# Patient Record
Sex: FEMALE | Race: WHITE | Employment: OTHER | URBAN - METROPOLITAN AREA
[De-identification: names, ages, dates, MRNs, and addresses within clinical notes are randomized per-mention and may not be internally consistent; named-entity substitution may affect disease eponyms.]

---

## 2017-04-21 ENCOUNTER — OFFICE VISIT (OUTPATIENT)
Dept: FAMILY MEDICINE | Facility: CLINIC | Age: 52
End: 2017-04-21
Payer: COMMERCIAL

## 2017-04-21 VITALS
WEIGHT: 121 LBS | HEIGHT: 63 IN | HEART RATE: 68 BPM | TEMPERATURE: 97.1 F | BODY MASS INDEX: 21.44 KG/M2 | DIASTOLIC BLOOD PRESSURE: 70 MMHG | SYSTOLIC BLOOD PRESSURE: 110 MMHG

## 2017-04-21 DIAGNOSIS — R19.7 DIARRHEA, UNSPECIFIED TYPE: Primary | ICD-10-CM

## 2017-04-21 LAB
BASOPHILS # BLD AUTO: 0 10E9/L (ref 0–0.2)
BASOPHILS NFR BLD AUTO: 0.5 %
DIFFERENTIAL METHOD BLD: NORMAL
EOSINOPHIL # BLD AUTO: 0 10E9/L (ref 0–0.7)
EOSINOPHIL NFR BLD AUTO: 0.9 %
ERYTHROCYTE [DISTWIDTH] IN BLOOD BY AUTOMATED COUNT: 13.2 % (ref 10–15)
ERYTHROCYTE [SEDIMENTATION RATE] IN BLOOD BY WESTERGREN METHOD: 8 MM/H (ref 0–30)
HCT VFR BLD AUTO: 39.9 % (ref 35–47)
HGB BLD-MCNC: 13.4 G/DL (ref 11.7–15.7)
LYMPHOCYTES # BLD AUTO: 1.4 10E9/L (ref 0.8–5.3)
LYMPHOCYTES NFR BLD AUTO: 32.2 %
MCH RBC QN AUTO: 30.9 PG (ref 26.5–33)
MCHC RBC AUTO-ENTMCNC: 33.6 G/DL (ref 31.5–36.5)
MCV RBC AUTO: 92 FL (ref 78–100)
MONOCYTES # BLD AUTO: 0.6 10E9/L (ref 0–1.3)
MONOCYTES NFR BLD AUTO: 13.1 %
NEUTROPHILS # BLD AUTO: 2.4 10E9/L (ref 1.6–8.3)
NEUTROPHILS NFR BLD AUTO: 53.3 %
PLATELET # BLD AUTO: 164 10E9/L (ref 150–450)
RBC # BLD AUTO: 4.33 10E12/L (ref 3.8–5.2)
WBC # BLD AUTO: 4.4 10E9/L (ref 4–11)

## 2017-04-21 PROCEDURE — 85652 RBC SED RATE AUTOMATED: CPT | Performed by: INTERNAL MEDICINE

## 2017-04-21 PROCEDURE — 36415 COLL VENOUS BLD VENIPUNCTURE: CPT | Performed by: INTERNAL MEDICINE

## 2017-04-21 PROCEDURE — 85025 COMPLETE CBC W/AUTO DIFF WBC: CPT | Performed by: INTERNAL MEDICINE

## 2017-04-21 PROCEDURE — 99214 OFFICE O/P EST MOD 30 MIN: CPT | Performed by: INTERNAL MEDICINE

## 2017-04-21 PROCEDURE — 80053 COMPREHEN METABOLIC PANEL: CPT | Performed by: INTERNAL MEDICINE

## 2017-04-21 NOTE — PROGRESS NOTES
SUBJECTIVE:                                                    Olga Vargas is a 51 year old female who presents to clinic today for the following health issues:      Diarrhea     Onset: 2 - 3 months     Description:   Consistency of stool: watery  Blood in stool: no   Number of loose stools in past 24 hours: 6    Progression of Symptoms:  intermittent    Accompanying Signs & Symptoms:  Fever: no   Nausea or vomiting; no   Abdominal pain: YES  Episodes of constipation: no   Weight loss: no    History:   Ill contacts: no   Recent use of antibiotics: no    Recent travels: YES- Went to [place] in March, but had an episode the morning before leaving, was fine 2 weeks, then started again         Recent medication-new or changes(Rx or OTC): no     Precipitating factors:   na    Alleviating factors:   Anti-diarrhea pill, helps       Therapies Tried and outcome:  Tums, immodium; Outcome: Not helpful     Olga is here with intermittent diarrhea, which has been going on for the past few months.  Does not recall any preceding medication changes, acute illnesses, or antibiotics.  Traveled to [place] last month but symptoms were going on prior to that. She will have about half a day of multiple, urgent, watery stools along with crampy abdominal pain.  Antidiarrheal medication will help and things will go back to normal, then 10 days later or so will have another episode. Yesterday lasted up until dinner which was unusual.  She otherwise feels well, but has been anxious about when the next episode of diarrhea will come, has to be near a toilet. There are no triggers for the diarrhea that she can identify.  Appetite is okay, no nausea or vomiting, no fevers or chills.   No blood in the stool. Has never woke her from sleep.     She had a colonoscopy 2 years ago that was normal.  She also has a history of intermittent RUQ pain that feels all the world like gallbladder attack, but she's had multiple RUQ ultrasounds that have been  "normal.  She's has more frequent RUQ pain episodes in the past 5 months or so as well. No one else at home has diarrhea.       Reviewed and updated as needed this visit by clinical staff  Tobacco  Allergies  Meds         ROS:  Constitutional, HEENT, cardiovascular, pulmonary, gi and gu systems are negative, except as otherwise noted.    OBJECTIVE:                                                    /70 (BP Location: Left arm, Patient Position: Chair, Cuff Size: Adult Regular)  Pulse 68  Temp 97.1  F (36.2  C) (Tympanic)  Ht 5' 3\" (1.6 m)  Wt 121 lb (54.9 kg)  BMI 21.43 kg/m2  Body mass index is 21.43 kg/(m^2).    Gen: well appearing, pleasant woman, no distress  HEENT: PERRL, sclera nonicteric, oropharynx clear, MMM  Neck: supple, no LAD  Pulm: breathing comfortably, CTAB, no wheezes or rales  CV: RRR, normal S1 and S2, no murmurs  Abd: BS present, soft, nontender, nondistended  Ext: 2+ distal pulses, no LE edema          ASSESSMENT/PLAN:                                                      1. Diarrhea, unspecified type  Infectious vs secretory vs osmotic vs inflammatory.  The intermittent nature is confusing. Advised paying special attention to association with dairy products or artificial sweeteners. Make sure to eat plenty of fiber.   - Clostridium difficile Toxin B PCR; Future  - CBC with platelets and differential  - Ova and Parasite Exam Routine; Future  - ESR: Erythrocyte sedimentation rate  - Comprehensive metabolic panel (BMP + Alb, Alk Phos, ALT, AST, Total. Bili, TP)    F/U pending lab results.        Anayeli Londono MD  [facility] [place]    "

## 2017-04-21 NOTE — MR AVS SNAPSHOT
After Visit Summary   4/21/2017    Olga Vargas    MRN: [id number]           Patient Information     Date Of Birth          1965        Visit Information        Provider Department      4/21/2017 1:20 PM Anayeli Londono MD [facility] [place]        Today's Diagnoses     Diarrhea, unspecified type    -  1       Follow-ups after your visit        Future tests that were ordered for you today     Open Future Orders        Priority Expected Expires Ordered    Ova and Parasite Exam Routine Routine  4/21/2018 4/21/2017    Clostridium difficile Toxin B PCR Routine  1/1/2018 4/21/2017            Who to contact     If you have questions or need follow up information about today's clinic visit or your schedule please contact [facility] [place] directly at [phone].  Normal or non-critical lab and imaging results will be communicated to you by MyChart, letter or phone within 4 business days after the clinic has received the results. If you do not hear from us within 7 days, please contact the clinic through MyChart or phone. If you have a critical or abnormal lab result, we will notify you by phone as soon as possible.  Submit refill requests through [employer] or call your pharmacy and they will forward the refill request to us. Please allow 3 business days for your refill to be completed.          Additional Information About Your Visit        MyChart Information     [employer] gives you secure access to your electronic health record. If you see a primary care provider, you can also send messages to your care team and make appointments. If you have questions, please call your primary care clinic.  If you do not have a primary care provider, please call [phone] and they will assist you.        Care EveryWhere ID     This is your Care EveryWhere ID. This could be used by other organizations to access your [place] medical records  [id number]        Your Vitals Were     Pulse  "Temperature Height BMI (Body Mass Index)          68 97.1  F (36.2  C) (Tympanic) 5' 3\" (1.6 m) 21.43 kg/m2         Blood Pressure from Last 3 Encounters:   04/21/17 110/70   11/10/16 105/70   03/17/16 120/80    Weight from Last 3 Encounters:   04/21/17 121 lb (54.9 kg)   11/10/16 122 lb (55.3 kg)   03/17/16 127 lb (57.6 kg)              We Performed the Following     CBC with platelets and differential     Comprehensive metabolic panel (BMP + Alb, Alk Phos, ALT, AST, Total. Bili, TP)     ESR: Erythrocyte sedimentation rate        Primary Care Provider Office Phone # Fax #    Mabel Brady [phone]       [address]  [place] [address]        Thank you!     Thank you for choosing [facility]  for your care. Our goal is always to provide you with excellent care. Hearing back from our patients is one way we can continue to improve our services. Please take a few minutes to complete the written survey that you may receive in the mail after your visit with us. Thank you!             Your Updated Medication List - Protect others around you: Learn how to safely use, store and throw away your medicines at www.disposemymeds.org.          This list is accurate as of: 4/21/17  2:09 PM.  Always use your most recent med list.                   Brand Name Dispense Instructions for use    atorvastatin 40 MG tablet    LIPITOR    30 tablet    TAKE 1 TABLET BY MOUTH AT BEDTIME       mometasone 50 MCG/ACT spray    NASONEX    51 g    USE 2 SPRAYS INTO EACH NOSTRIL ONCE DAILY         "

## 2017-04-21 NOTE — NURSING NOTE
"Chief Complaint   Patient presents with     Diarrhea       Initial /70 (BP Location: Left arm, Patient Position: Chair, Cuff Size: Adult Regular)  Pulse 68  Temp 97.1  F (36.2  C) (Tympanic)  Ht 5' 3\" (1.6 m)  Wt 121 lb (54.9 kg)  BMI 21.43 kg/m2 Estimated body mass index is 21.43 kg/(m^2) as calculated from the following:    Height as of this encounter: 5' 3\" (1.6 m).    Weight as of this encounter: 121 lb (54.9 kg).  Medication Reconciliation: complete  "

## 2017-04-22 LAB
ALBUMIN SERPL-MCNC: 4.1 G/DL (ref 3.4–5)
ALP SERPL-CCNC: 75 U/L (ref 40–150)
ALT SERPL W P-5'-P-CCNC: 19 U/L (ref 0–50)
ANION GAP SERPL CALCULATED.3IONS-SCNC: 11 MMOL/L (ref 3–14)
AST SERPL W P-5'-P-CCNC: 14 U/L (ref 0–45)
BILIRUB SERPL-MCNC: 0.6 MG/DL (ref 0.2–1.3)
BUN SERPL-MCNC: 13 MG/DL (ref 7–30)
CALCIUM SERPL-MCNC: 8.7 MG/DL (ref 8.5–10.1)
CHLORIDE SERPL-SCNC: 107 MMOL/L (ref 94–109)
CO2 SERPL-SCNC: 24 MMOL/L (ref 20–32)
CREAT SERPL-MCNC: 0.72 MG/DL (ref 0.52–1.04)
GFR SERPL CREATININE-BSD FRML MDRD: 85 ML/MIN/1.7M2
GLUCOSE SERPL-MCNC: 71 MG/DL (ref 70–99)
POTASSIUM SERPL-SCNC: 3.7 MMOL/L (ref 3.4–5.3)
PROT SERPL-MCNC: 7.4 G/DL (ref 6.8–8.8)
SODIUM SERPL-SCNC: 142 MMOL/L (ref 133–144)

## 2017-05-03 DIAGNOSIS — R19.7 DIARRHEA, UNSPECIFIED TYPE: ICD-10-CM

## 2017-05-03 LAB
C DIFF TOX B STL QL: ABNORMAL
SPECIMEN SOURCE: ABNORMAL

## 2017-05-03 PROCEDURE — 87493 C DIFF AMPLIFIED PROBE: CPT | Performed by: INTERNAL MEDICINE

## 2017-05-03 PROCEDURE — 87177 OVA AND PARASITES SMEARS: CPT | Performed by: INTERNAL MEDICINE

## 2017-05-03 PROCEDURE — 87209 SMEAR COMPLEX STAIN: CPT | Performed by: INTERNAL MEDICINE

## 2017-05-04 ENCOUNTER — TELEPHONE (OUTPATIENT)
Dept: FAMILY MEDICINE | Facility: CLINIC | Age: 52
End: 2017-05-04

## 2017-05-04 DIAGNOSIS — A04.72 C. DIFFICILE COLITIS: Primary | ICD-10-CM

## 2017-05-04 LAB
MICRO REPORT STATUS: NORMAL
O+P STL MICRO: NORMAL
SPECIMEN SOURCE: NORMAL

## 2017-05-04 RX ORDER — METRONIDAZOLE 500 MG/1
500 TABLET ORAL 3 TIMES DAILY
Qty: 42 TABLET | Refills: 0 | Status: SHIPPED | OUTPATIENT
Start: 2017-05-04 | End: 2017-05-18

## 2017-05-04 NOTE — TELEPHONE ENCOUNTER
Spoke with patient and informed of below. Patient/ parent verbalized understanding and agrees with plan.  Education provided on C. Diff and how to prevent spreading the bacteria.     How does it spread?  C. diff leaves your body through your stool. You can get infected when :  1. You touch a surface that has this germ, then  2. You touch food or something else that you put in your mouth.  Here's how you, your family and visitors can help prevent the infection from spreading:     Wash hands often, especially in the hospital, after using the bathroom and before you eat.    Use antibiotics only when you need them. Don't ask for them if your doctor says you have a virus.    If you take antibiotics, follow the directions. Finish all the pills, even if you feel better.    If you have C. diff infection, try to use a separate restroom until you are well.    At home, clean countertops, sinks, faucets, bathroom doorknobs and toilets often. Use warm water with soap or cleaning products with bleach. (Don't use pure bleach. It's too strong.)    In the hospital, your care team should wear gloves and gowns. They should clean their hands before touching you and before leaving the room. If they don't, please remind them.  Hand washing  For this illness, soap and water works better than hand .    Wash hands with warm water and plenty of soap. Wash for 15 to 20 seconds.    Clean under nails, between fingers and up the wrists.    Rinse hands, letting water run down your fingers.    Dry hands well. Use a paper towel to turn off the faucet and open the door.     Sudha Avila RN   [facility] - Triage

## 2017-05-04 NOTE — TELEPHONE ENCOUNTER
Please call Olga and let her know stool test came back positive for C diff.  I sent metronidazole in to her pharmacy - she should take this for 14 days, avoid alcohol while taking.     Anayeli Londono MD

## 2017-05-09 DIAGNOSIS — E78.5 HYPERLIPIDEMIA LDL GOAL <130: ICD-10-CM

## 2017-05-09 NOTE — TELEPHONE ENCOUNTER
atorvastatin        Last Written Prescription Date: 10/25/2016  Last Fill Quantity: 30, # refills: 5    Last Office Visit with G, P or [facility] prescribing provider:  4/21/2017   Future Office Visit:      BP Readings from Last 3 Encounters:   04/21/17 110/70   11/10/16 105/70   03/17/16 120/80     Lab Results   Component Value Date    ALT 19 04/21/2017     Lab Results   Component Value Date    CHOL 204 10/24/2016     Lab Results   Component Value Date    HDL 67 10/24/2016     Lab Results   Component Value Date     10/24/2016     Lab Results   Component Value Date    TRIG 109 10/24/2016     Lab Results   Component Value Date    CHOLHDLRATIO 3.5 12/19/2014

## 2017-05-10 RX ORDER — ATORVASTATIN CALCIUM 40 MG/1
40 TABLET, FILM COATED ORAL AT BEDTIME
Qty: 90 TABLET | Refills: 0 | Status: SHIPPED | OUTPATIENT
Start: 2017-05-10

## 2017-05-10 NOTE — TELEPHONE ENCOUNTER
Due for annual physical  Medication is being filled for 1 time refill only due to:  due for CPE    Elaine Nye RN

## 2017-06-14 ENCOUNTER — TRANSFERRED RECORDS (OUTPATIENT)
Dept: HEALTH INFORMATION MANAGEMENT | Facility: CLINIC | Age: 52
End: 2017-06-14

## 2017-06-26 ENCOUNTER — TELEPHONE (OUTPATIENT)
Dept: FAMILY MEDICINE | Facility: CLINIC | Age: 52
End: 2017-06-26

## 2017-06-26 ENCOUNTER — OFFICE VISIT (OUTPATIENT)
Dept: FAMILY MEDICINE | Facility: CLINIC | Age: 52
End: 2017-06-26
Payer: COMMERCIAL

## 2017-06-26 VITALS
WEIGHT: 119 LBS | BODY MASS INDEX: 21.08 KG/M2 | SYSTOLIC BLOOD PRESSURE: 120 MMHG | HEART RATE: 59 BPM | TEMPERATURE: 97 F | DIASTOLIC BLOOD PRESSURE: 68 MMHG | OXYGEN SATURATION: 99 %

## 2017-06-26 DIAGNOSIS — R10.84 ABDOMINAL PAIN, GENERALIZED: Primary | ICD-10-CM

## 2017-06-26 PROCEDURE — 99213 OFFICE O/P EST LOW 20 MIN: CPT | Performed by: INTERNAL MEDICINE

## 2017-06-26 NOTE — PROGRESS NOTES
SUBJECTIVE:                                                    Olga Vargas is a 51 year old female who presents to clinic today for the following health issues:      ABDOMINAL PAIN     Onset: a month ago     Description:   Character: Sharp  Location: bilateral mid-stomach  Radiation: Back    Intensity: mild, severe    Progression of Symptoms:  intermittent    Accompanying Signs & Symptoms:  Fever/Chills?: no   Gas/Bloating: YES  Nausea: no   Vomitting: no   Diarrhea?: no   Constipation:no   Dysuria or Hematuria: no    History:   Trauma: no   Previous similar pain: no    Previous tests done: none    Precipitating factors:   Does the pain change with:     Food: no      BM: no     Urination: no     Alleviating factors:  na    Therapies Tried and outcome: Advil; helped. is much worse when lying down on back, pt notes now getting heart burn      LMP:  not applicable       Finished flagyl about 6 weeks ago.   Past 10 days having stomach pain that comes and goes. Located in RLQ/right side and LLQ/left side of her abdomen, less so in the middle.    Bowel movements are mostly back to normal, no blood.    There does not seem to be any pattern to the pain, except laying down at night does make it worse. The past 2 or 3 nights the pain has woken her up at night.    Seems to be getting worse.    Appetite is okay, no n/v. No fevers.   Also having heart burn now, started after the abdominal pain. Has been cutting down on typical GERD trigger foods which is helping a little.  Has taken some alkaseltzer for the GERD, but that didn't help.    Advil did help the stomach pain to some degree, but she has taken it infrequently.   Going to [place] in a week and a half for vacation.          Reviewed and updated as needed this visit by clinical staff  Tobacco  Allergies  Meds         ROS:  Const, cv, pulm, gi, and gu reviewed, negative unless noted above     OBJECTIVE:     /68 (BP Location: Right arm, Patient Position: Chair,  Cuff Size: Adult Regular)  Pulse 59  Temp 97  F (36.1  C) (Tympanic)  Wt 119 lb (54 kg)  SpO2 99%  BMI 21.08 kg/m2  Body mass index is 21.08 kg/(m^2).    Gen: well appearing, pleasant woman, no distress  HEENT: PERRL, sclera nonicteric, MMM  Pulm: breathing comfortably, CTAB, no wheezes or rales  CV: RRR, normal S1 and S2, no murmurs  Abd: BS present, soft, nontender, nondistended  Ext: 2+ distal pulses, no LE edema         ASSESSMENT/PLAN:       1. Abdominal pain, generalized  Unclear etiology - there are no major red flags besides the fact that it has woken her up a couple times at night.  Without diarrhea I do not suspect recurrence of C diff, may be some resolving inflammation?  Recommended symptomatic care with ibuprofen or tylenol, hot packs, bland diet, OTC medications for a few days.  If not improved or worsening by the end of this week, she will let me know and will plan to do CT scan, especially since she is going out of the [place] later next week.  Patient in agreement with plan.           Anayeli Londono MD  [facility] [place] [place]

## 2017-06-26 NOTE — TELEPHONE ENCOUNTER
Olga Vargas is a 51 year old female  who calls with abdominal pain. States that she had C diff about 1 month ago and was treated with Flagyl. Diarrhea is gone, but has had a couple of softer, looser stools since then. Main complaint is intermittent abdominal pain. Feels like a band of pain that wraps around her sides. This morning the pain is tolerable. Last night it was quite severe.    NURSING ASSESSMENT:  The pain began 1 month ago.    Pain scale (0-10): 7/10  LMP  was  Did not assess.    Allergies:   Allergies   Allergen Reactions     Morphine Other (See Comments)     Fainted       NURSING PLAN: Nursing advice to patient appointment today.    RECOMMENDED DISPOSITION:  See in 4 hours. Scheduled for 11:20  Will comply with recommendation: Yes  If further questions/concerns or if symptoms do not improve, worsen or new symptoms develop, call your PCP or [place] [facility] as soon as possible.    Guideline used:  Telephone Triage Protocols for Nurses, Fourth Edition, Catherine Rebolledo RN

## 2017-06-26 NOTE — MR AVS SNAPSHOT
After Visit Summary   6/26/2017    Olga Vargas    MRN: [id number]           Patient Information     Date Of Birth          1965        Visit Information        Provider Department      6/26/2017 11:20 AM Anayeli Londono MD [facility] [place]        Today's Diagnoses     Abdominal pain, generalized    -  1       Follow-ups after your visit        Who to contact     If you have questions or need follow up information about today's clinic visit or your schedule please contact [facility] [place] directly at [phone].  Normal or non-critical lab and imaging results will be communicated to you by [employer], letter or phone within 4 business days after the clinic has received the results. If you do not hear from us within 7 days, please contact the clinic through [employer] or phone. If you have a critical or abnormal lab result, we will notify you by phone as soon as possible.  Submit refill requests through [employer] or call your pharmacy and they will forward the refill request to us. Please allow 3 business days for your refill to be completed.          Additional Information About Your Visit        MyChart Information     [employer] gives you secure access to your electronic health record. If you see a primary care provider, you can also send messages to your care team and make appointments. If you have questions, please call your primary care clinic.  If you do not have a primary care provider, please call [phone] and they will assist you.        Care EveryWhere ID     This is your Care EveryWhere ID. This could be used by other organizations to access your [place] medical records  [id number]        Your Vitals Were     Pulse Temperature Pulse Oximetry BMI (Body Mass Index)          59 97  F (36.1  C) (Tympanic) 99% 21.08 kg/m2         Blood Pressure from Last 3 Encounters:   06/26/17 120/68   04/21/17 110/70   11/10/16 105/70    Weight from Last 3 Encounters:    06/26/17 119 lb (54 kg)   04/21/17 121 lb (54.9 kg)   11/10/16 122 lb (55.3 kg)              Today, you had the following     No orders found for display       Primary Care Provider Office Phone # Fax #    Anayeli Londono [phone]       [facility] [address] [place] [address] [address]  [place] [address]        Equal Access to Services     JIAN LARSEN : Hadii aad ku hadasho Soomaali, waaxda luqadaha, qaybta kaalmada adeegyada, waxay idiin hayaan adeeg kharash la'aan . So [facility] [phone].    ATENCIÓN: Si habla español, tiene a doe disposición servicios gratuitos de asistencia lingüística. [facility] [phone].    We comply with applicable federal civil rights laws and [place] laws. We do not discriminate on the basis of race, color, national origin, age, disability sex, sexual orientation or gender identity.            Thank you!     Thank you for choosing [facility] [place]  for your care. Our goal is always to provide you with excellent care. Hearing back from our patients is one way we can continue to improve our services. Please take a few minutes to complete the written survey that you may receive in the mail after your visit with us. Thank you!             Your Updated Medication List - Protect others around you: Learn how to safely use, store and throw away your medicines at www.disposemymeds.org.          This list is accurate as of: 6/26/17  8:31 PM.  Always use your most recent med list.                   Brand Name Dispense Instructions for use Diagnosis    atorvastatin 40 MG tablet    LIPITOR    90 tablet    Take 1 tablet (40 mg) by mouth At Bedtime . Due for annual physical exam    Hyperlipidemia LDL goal <130       mometasone 50 MCG/ACT spray    NASONEX    51 g    USE 2 SPRAYS INTO EACH NOSTRIL ONCE DAILY    Perennial allergic rhinitis

## 2017-06-26 NOTE — NURSING NOTE
"Chief Complaint   Patient presents with     Abdominal Pain       Initial /68 (BP Location: Right arm, Patient Position: Chair, Cuff Size: Adult Regular)  Pulse 59  Temp 97  F (36.1  C) (Tympanic)  Wt 119 lb (54 kg)  SpO2 99%  BMI 21.08 kg/m2 Estimated body mass index is 21.08 kg/(m^2) as calculated from the following:    Height as of 4/21/17: 5' 3\" (1.6 m).    Weight as of this encounter: 119 lb (54 kg).  Medication Reconciliation: complete  "

## 2018-01-24 ENCOUNTER — THERAPY VISIT (OUTPATIENT)
Dept: PHYSICAL THERAPY | Facility: CLINIC | Age: 53
End: 2018-01-24
Payer: COMMERCIAL

## 2018-01-24 DIAGNOSIS — M25.561 ACUTE PAIN OF RIGHT KNEE: Primary | ICD-10-CM

## 2018-01-24 PROCEDURE — 97161 PT EVAL LOW COMPLEX 20 MIN: CPT | Mod: GP

## 2018-01-24 PROCEDURE — 97035 APP MDLTY 1+ULTRASOUND EA 15: CPT | Mod: GP

## 2018-01-24 PROCEDURE — 97110 THERAPEUTIC EXERCISES: CPT | Mod: GP

## 2018-01-24 ASSESSMENT — ACTIVITIES OF DAILY LIVING (ADL)
GO DOWN STAIRS: ACTIVITY IS NOT DIFFICULT
STIFFNESS: I HAVE THE SYMPTOM BUT IT DOES NOT AFFECT MY ACTIVITY
LIMPING: I DO NOT HAVE THE SYMPTOM
AS_A_RESULT_OF_YOUR_KNEE_INJURY,_HOW_WOULD_YOU_RATE_YOUR_CURRENT_LEVEL_OF_DAILY_ACTIVITY?: NEARLY NORMAL
HOW_WOULD_YOU_RATE_THE_OVERALL_FUNCTION_OF_YOUR_KNEE_DURING_YOUR_USUAL_DAILY_ACTIVITIES?: NEARLY NORMAL
PAIN: I DO NOT HAVE THE SYMPTOM
GIVING WAY, BUCKLING OR SHIFTING OF KNEE: I DO NOT HAVE THE SYMPTOM
KNEEL ON THE FRONT OF YOUR KNEE: ACTIVITY IS NOT DIFFICULT
RISE FROM A CHAIR: ACTIVITY IS NOT DIFFICULT
SIT WITH YOUR KNEE BENT: ACTIVITY IS MINIMALLY DIFFICULT
WALK: ACTIVITY IS NOT DIFFICULT
HOW_WOULD_YOU_RATE_THE_CURRENT_FUNCTION_OF_YOUR_KNEE_DURING_YOUR_USUAL_DAILY_ACTIVITIES_ON_A_SCALE_FROM_0_TO_100_WITH_100_BEING_YOUR_LEVEL_OF_KNEE_FUNCTION_PRIOR_TO_YOUR_INJURY_AND_0_BEING_THE_INABILITY_TO_PERFORM_ANY_OF_YOUR_USUAL_DAILY_ACTIVITIES?: 90
SQUAT: ACTIVITY IS MINIMALLY DIFFICULT
RAW_SCORE: 66
KNEE_ACTIVITY_OF_DAILY_LIVING_SUM: 66
GO UP STAIRS: ACTIVITY IS NOT DIFFICULT
SWELLING: I DO NOT HAVE THE SYMPTOM
STAND: ACTIVITY IS NOT DIFFICULT
WEAKNESS: I HAVE THE SYMPTOM BUT IT DOES NOT AFFECT MY ACTIVITY
KNEE_ACTIVITY_OF_DAILY_LIVING_SCORE: 94.29

## 2018-01-24 NOTE — PROGRESS NOTES
[facility] [facility] Initial Evaluation  Subjective:  Patient is a 52 year old female presenting with rehab right knee hpi.   Olga Vargas is a 52 year old female with a right knee condition.  Condition occurred with:  Insidious onset.  Condition occurred: for unknown reasons.  This is a recurrent condition  Onset: on and off medial knee pain thinks it might be due to biking but not always; does bike trips sometimes (but not last year).  .    Patient reports pain:  Medial.     and is intermittent Pain Scale: When present, 7-8/10.  Associated symptoms:  Loss of motion/stiffness. Worse during: N/A   Symptoms are exacerbated by ascending stairs and descending stairs (my bother with biking ) and relieved by rest (avoidance).  Since onset symptoms are unchanged.  Special tests:  X-ray.                                                    Objective:  System                                                Knee Evaluation:  ROM:  AROM: normal  Strength wnl knee: proximal hip strength grossly 4+/5.            Ligament Testing:  Normal                Special Tests: Normal      Palpation:      Right knee tenderness present at:  Medial Joint Line            General     ROS    Assessment/Plan:    Patient is a 52 year old female with right side knee complaints.    Patient has the following significant findings with corresponding treatment plan.                Diagnosis 1:  R medial knee pain, pes anserine TTP, proximal weakness   Pain -  manual therapy, self management and home program  Decreased strength - therapeutic exercise and therapeutic activities  Impaired muscle performance - neuro re-education  Decreased function - therapeutic activities    Therapy Evaluation Codes:   1) History comprised of:   Personal factors that impact the plan of care:      None.    Comorbidity factors that impact the plan of care are:      None.     Medications impacting care: None.  2) Examination of Body Systems comprised of:   Body  structures and functions that impact the plan of care:      Knee.   Activity limitations that impact the plan of care are:      Squatting/kneeling and Stairs.  3) Clinical presentation characteristics are:   Stable/Uncomplicated.  4) Decision-Making    Low complexity using standardized patient assessment instrument and/or measureable assessment of functional outcome.  Cumulative Therapy Evaluation is: Low complexity.    Previous and current functional limitations:  (See Goal Flow Sheet for this information)    Short term and Long term goals: (See Goal Flow Sheet for this information)     Communication ability:  Patient appears to be able to clearly communicate and understand verbal and written communication and follow directions correctly.  Treatment Explanation - The following has been discussed with the patient:   RX ordered/plan of care  Anticipated outcomes  Possible risks and side effects  This patient would benefit from PT intervention to resume normal activities.   Rehab potential is good.    Frequency:  1 X week, once daily  Duration:  for 8 weeks  Discharge Plan:  Achieve all LTG.  Independent in home treatment program.  Reach maximal therapeutic benefit.    Please refer to the daily flowsheet for treatment today, total treatment time and time spent performing 1:1 timed codes.

## 2018-01-24 NOTE — MR AVS SNAPSHOT
After Visit Summary   1/24/2018    Olga Vargas    MRN: [id number]           Patient Information     Date Of Birth          1965        Visit Information        Provider Department      1/24/2018 9:40 AM [place] [place] [facility]        Today's Diagnoses     Acute pain of right knee    -  1       Follow-ups after your visit        Your next 10 appointments already scheduled     Jan 30, 2018  1:00 PM CST   DEN[facility] Extremity with [place] [facility] [place] [facility] ([facility] [place])    [address] [place] [address]  [address]  [place] [address]   [phone]            Feb 07, 2018  4:10 PM CST   DENITA Extremity with [place] [facility] [place] [facility] ([facility] [place])    [address] [place] [address]  [address]  [place] [address]   [phone]            Feb 13, 2018 10:30 AM CST   DENITA Extremity with [place] [facility] [place] [facility] ([facility] [place])    [address] [place] [address]  [address]  [place] [address]   [phone]            Feb 20, 2018  2:20 PM CST   DENITA Extremity with [place] [facility] [place] [facility] ([facility] [place])    [address] [place] [address]  [address]  [place] [address]   [phone]              Who to contact     If you have questions or need follow up information about today's clinic visit or your schedule please contact [facility] [place] [facility] directly at [phone].  Normal or non-critical lab and imaging results will be communicated to you by MyChart, letter or phone within 4 business days after the clinic has received the results. If you do not hear from us within 7 days, please contact the clinic through MyChart or phone. If you have a critical or abnormal lab result, we will notify you by phone as soon as  possible.  Submit refill requests through [facility] or call your pharmacy and they will forward the refill request to us. Please allow 3 business days for your refill to be completed.          Additional Information About Your Visit        [facility] Information     [facility] gives you secure access to your electronic health record. If you see a primary care provider, you can also send messages to your care team and make appointments. If you have questions, please call your primary care clinic.  If you do not have a primary care provider, please call [phone] and they will assist you.        Care EveryWhere ID     This is your Care EveryWhere ID. This could be used by other organizations to access your [place] medical records  [id number]         Blood Pressure from Last 3 Encounters:   06/26/17 120/68   04/21/17 110/70   11/10/16 105/70    Weight from Last 3 Encounters:   06/26/17 54 kg (119 lb)   04/21/17 54.9 kg (121 lb)   11/10/16 55.3 kg (122 lb)              We Performed the Following     HC PT EVAL, LOW COMPLEXITY     THERAPEUTIC EXERCISES     ULTRASOUND THERAPY        Primary Care Provider Office Phone # Fax #    Anayeli Londono [phone]       [address]  [place] [address]        Equal Access to Services     JIAN LARSEN : Hadii aad ku hadasho Soomaali, waaxda luqadaha, qaybta kaalmada adeegyada, waxay watson glez. So [facility] [phone].    ATENCIÓN: Si habla español, tiene a doe disposición servicios gratPhytoCeuticaos de asistencia lingüística. Llame al [phone].    We comply with applicable federal civil rights laws and [place] laws. We do not discriminate on the basis of race, color, national origin, age, disability, sex, sexual orientation, or gender identity.            Thank you!     [facility] you for [facility]  [place] [facility]  [facility]. Our goal is always to provide you with excellent care.  Hearing back from our patients is one way we can continue to improve our services. Please take a few minutes to complete the written survey that you may receive in the mail after your visit with us. Thank you!             Your Updated Medication List - Protect others around you: Learn how to safely use, store and throw away your medicines at www.disposemymeds.org.          This list is accurate as of 1/24/18 10:54 AM.  Always use your most recent med list.                   Brand Name Dispense Instructions for use Diagnosis    atorvastatin 40 MG tablet    LIPITOR    90 tablet    Take 1 tablet (40 mg) by mouth At Bedtime . Due for annual physical exam    Hyperlipidemia LDL goal <130       mometasone 50 MCG/ACT spray    NASONEX    51 g    USE 2 SPRAYS INTO EACH NOSTRIL ONCE DAILY    Perennial allergic rhinitis

## 2018-01-24 NOTE — PROGRESS NOTES
[facility] Initial Evaluation  Subjective:  Patient is a 52 year old female presenting with rehab left ankle/foot hpi.                                      Pertinent medical history includes:  Menopausal (Changes in bowel/bladder).  Medical allergies: yes (Morphine).  Surgical history: appendix.  Current medications:  None as reported by patient.  Current occupation is   .    Employment tasks: computer work.                                Objective:  System    Physical Exam    General     ROS    Assessment/Plan:

## 2018-01-30 ENCOUNTER — THERAPY VISIT (OUTPATIENT)
Dept: PHYSICAL THERAPY | Facility: CLINIC | Age: 53
End: 2018-01-30
Payer: COMMERCIAL

## 2018-01-30 DIAGNOSIS — M25.561 ACUTE PAIN OF RIGHT KNEE: ICD-10-CM

## 2018-01-30 PROCEDURE — 97112 NEUROMUSCULAR REEDUCATION: CPT | Mod: GP

## 2018-01-30 PROCEDURE — 97140 MANUAL THERAPY 1/> REGIONS: CPT | Mod: GP

## 2018-01-30 PROCEDURE — 97110 THERAPEUTIC EXERCISES: CPT | Mod: GP

## 2018-02-07 ENCOUNTER — THERAPY VISIT (OUTPATIENT)
Dept: PHYSICAL THERAPY | Facility: CLINIC | Age: 53
End: 2018-02-07
Payer: COMMERCIAL

## 2018-02-07 DIAGNOSIS — M25.561 ACUTE PAIN OF RIGHT KNEE: ICD-10-CM

## 2018-02-07 PROCEDURE — 97112 NEUROMUSCULAR REEDUCATION: CPT | Mod: GP

## 2018-02-07 PROCEDURE — 97110 THERAPEUTIC EXERCISES: CPT | Mod: GP

## 2018-02-13 ENCOUNTER — THERAPY VISIT (OUTPATIENT)
Dept: PHYSICAL THERAPY | Facility: CLINIC | Age: 53
End: 2018-02-13
Payer: COMMERCIAL

## 2018-02-13 DIAGNOSIS — M25.561 ACUTE PAIN OF RIGHT KNEE: ICD-10-CM

## 2018-02-13 PROCEDURE — 97112 NEUROMUSCULAR REEDUCATION: CPT | Mod: GP

## 2018-02-13 PROCEDURE — 97530 THERAPEUTIC ACTIVITIES: CPT | Mod: GP

## 2018-02-13 PROCEDURE — 97140 MANUAL THERAPY 1/> REGIONS: CPT | Mod: GP

## 2018-02-20 ENCOUNTER — THERAPY VISIT (OUTPATIENT)
Dept: PHYSICAL THERAPY | Facility: CLINIC | Age: 53
End: 2018-02-20
Payer: COMMERCIAL

## 2018-02-20 DIAGNOSIS — M25.561 ACUTE PAIN OF RIGHT KNEE: ICD-10-CM

## 2018-02-20 PROCEDURE — 97140 MANUAL THERAPY 1/> REGIONS: CPT | Mod: GP

## 2018-02-20 PROCEDURE — 97110 THERAPEUTIC EXERCISES: CPT | Mod: GP

## 2018-02-20 PROCEDURE — 97112 NEUROMUSCULAR REEDUCATION: CPT | Mod: GP

## 2019-05-16 ENCOUNTER — HEALTH MAINTENANCE LETTER (OUTPATIENT)
Age: 54
End: 2019-05-16

## 2019-09-30 ENCOUNTER — HEALTH MAINTENANCE LETTER (OUTPATIENT)
Age: 54
End: 2019-09-30

## 2019-10-29 ENCOUNTER — HEALTH MAINTENANCE LETTER (OUTPATIENT)
Age: 54
End: 2019-10-29

## 2020-02-06 ENCOUNTER — APPOINTMENT (OUTPATIENT)
Dept: CT IMAGING | Facility: CLINIC | Age: 55
End: 2020-02-06
Attending: EMERGENCY MEDICINE
Payer: COMMERCIAL

## 2020-02-06 ENCOUNTER — HOSPITAL ENCOUNTER (EMERGENCY)
Facility: CLINIC | Age: 55
Discharge: HOME | End: 2020-02-06
Attending: EMERGENCY MEDICINE | Admitting: EMERGENCY MEDICINE
Payer: COMMERCIAL

## 2020-02-06 VITALS
HEART RATE: 65 BPM | BODY MASS INDEX: 21.26 KG/M2 | TEMPERATURE: 97.4 F | HEIGHT: 63 IN | DIASTOLIC BLOOD PRESSURE: 75 MMHG | OXYGEN SATURATION: 100 % | WEIGHT: 120 LBS | RESPIRATION RATE: 20 BRPM | SYSTOLIC BLOOD PRESSURE: 119 MMHG

## 2020-02-06 DIAGNOSIS — N20.0 KIDNEY STONE: ICD-10-CM

## 2020-02-06 LAB
ALBUMIN UR-MCNC: NEGATIVE MG/DL
ANION GAP SERPL CALCULATED.3IONS-SCNC: 7 MMOL/L (ref 3–14)
APPEARANCE UR: CLEAR
BASOPHILS # BLD AUTO: 0 10E9/L (ref 0–0.2)
BASOPHILS NFR BLD AUTO: 0.8 %
BILIRUB UR QL STRIP: NEGATIVE
BUN SERPL-MCNC: 18 MG/DL (ref 7–30)
CALCIUM SERPL-MCNC: 9.1 MG/DL (ref 8.5–10.1)
CHLORIDE SERPL-SCNC: 108 MMOL/L (ref 94–109)
CO2 SERPL-SCNC: 22 MMOL/L (ref 20–32)
COLOR UR AUTO: ABNORMAL
CREAT SERPL-MCNC: 0.73 MG/DL (ref 0.52–1.04)
DIFFERENTIAL METHOD BLD: NORMAL
EOSINOPHIL # BLD AUTO: 0.1 10E9/L (ref 0–0.7)
EOSINOPHIL NFR BLD AUTO: 1.4 %
ERYTHROCYTE [DISTWIDTH] IN BLOOD BY AUTOMATED COUNT: 12.8 % (ref 10–15)
GFR SERPL CREATININE-BSD FRML MDRD: >90 ML/MIN/{1.73_M2}
GLUCOSE SERPL-MCNC: 147 MG/DL (ref 70–99)
GLUCOSE UR STRIP-MCNC: NEGATIVE MG/DL
HCT VFR BLD AUTO: 40.2 % (ref 35–47)
HGB BLD-MCNC: 13.7 G/DL (ref 11.7–15.7)
HGB UR QL STRIP: NEGATIVE
IMM GRANULOCYTES # BLD: 0 10E9/L (ref 0–0.4)
IMM GRANULOCYTES NFR BLD: 0 %
KETONES UR STRIP-MCNC: NEGATIVE MG/DL
LEUKOCYTE ESTERASE UR QL STRIP: NEGATIVE
LYMPHOCYTES # BLD AUTO: 2.4 10E9/L (ref 0.8–5.3)
LYMPHOCYTES NFR BLD AUTO: 49.1 %
MCH RBC QN AUTO: 30.5 PG (ref 26.5–33)
MCHC RBC AUTO-ENTMCNC: 34.1 G/DL (ref 31.5–36.5)
MCV RBC AUTO: 90 FL (ref 78–100)
MONOCYTES # BLD AUTO: 0.7 10E9/L (ref 0–1.3)
MONOCYTES NFR BLD AUTO: 13.4 %
MUCOUS THREADS #/AREA URNS LPF: PRESENT /LPF
NEUTROPHILS # BLD AUTO: 1.7 10E9/L (ref 1.6–8.3)
NEUTROPHILS NFR BLD AUTO: 35.3 %
NITRATE UR QL: NEGATIVE
NRBC # BLD AUTO: 0 10*3/UL
NRBC BLD AUTO-RTO: 0 /100
PH UR STRIP: 7 PH (ref 5–7)
PLATELET # BLD AUTO: 194 10E9/L (ref 150–450)
POTASSIUM SERPL-SCNC: 3.1 MMOL/L (ref 3.4–5.3)
RBC # BLD AUTO: 4.49 10E12/L (ref 3.8–5.2)
RBC #/AREA URNS AUTO: <1 /HPF (ref 0–2)
SODIUM SERPL-SCNC: 137 MMOL/L (ref 133–144)
SOURCE: ABNORMAL
SP GR UR STRIP: 1.01 (ref 1–1.03)
UROBILINOGEN UR STRIP-MCNC: NORMAL MG/DL (ref 0–2)
WBC # BLD AUTO: 4.9 10E9/L (ref 4–11)
WBC #/AREA URNS AUTO: 1 /HPF (ref 0–5)

## 2020-02-06 PROCEDURE — 96375 TX/PRO/DX INJ NEW DRUG ADDON: CPT

## 2020-02-06 PROCEDURE — 25000128 H RX IP 250 OP 636: Performed by: EMERGENCY MEDICINE

## 2020-02-06 PROCEDURE — 99285 EMERGENCY DEPT VISIT HI MDM: CPT | Mod: 25

## 2020-02-06 PROCEDURE — 25800030 ZZH RX IP 258 OP 636: Performed by: EMERGENCY MEDICINE

## 2020-02-06 PROCEDURE — 81001 URINALYSIS AUTO W/SCOPE: CPT | Performed by: EMERGENCY MEDICINE

## 2020-02-06 PROCEDURE — 80048 BASIC METABOLIC PNL TOTAL CA: CPT | Performed by: EMERGENCY MEDICINE

## 2020-02-06 PROCEDURE — 96374 THER/PROPH/DIAG INJ IV PUSH: CPT | Mod: 59

## 2020-02-06 PROCEDURE — 74177 CT ABD & PELVIS W/CONTRAST: CPT

## 2020-02-06 PROCEDURE — 25000132 ZZH RX MED GY IP 250 OP 250 PS 637: Performed by: EMERGENCY MEDICINE

## 2020-02-06 PROCEDURE — 87086 URINE CULTURE/COLONY COUNT: CPT | Performed by: EMERGENCY MEDICINE

## 2020-02-06 PROCEDURE — 85025 COMPLETE CBC W/AUTO DIFF WBC: CPT | Performed by: EMERGENCY MEDICINE

## 2020-02-06 PROCEDURE — 96361 HYDRATE IV INFUSION ADD-ON: CPT

## 2020-02-06 PROCEDURE — 25000125 ZZHC RX 250: Performed by: EMERGENCY MEDICINE

## 2020-02-06 RX ORDER — IBUPROFEN 600 MG/1
600 TABLET, FILM COATED ORAL EVERY 6 HOURS PRN
Qty: 30 TABLET | Refills: 0 | Status: SHIPPED | OUTPATIENT
Start: 2020-02-06

## 2020-02-06 RX ORDER — KETOROLAC TROMETHAMINE 15 MG/ML
15 INJECTION, SOLUTION INTRAMUSCULAR; INTRAVENOUS ONCE
Status: COMPLETED | OUTPATIENT
Start: 2020-02-06 | End: 2020-02-06

## 2020-02-06 RX ORDER — FENTANYL CITRATE 50 UG/ML
25 INJECTION, SOLUTION INTRAMUSCULAR; INTRAVENOUS ONCE
Status: COMPLETED | OUTPATIENT
Start: 2020-02-06 | End: 2020-02-06

## 2020-02-06 RX ORDER — IOPAMIDOL 755 MG/ML
60 INJECTION, SOLUTION INTRAVASCULAR ONCE
Status: COMPLETED | OUTPATIENT
Start: 2020-02-06 | End: 2020-02-06

## 2020-02-06 RX ORDER — ONDANSETRON 4 MG/1
4 TABLET, ORALLY DISINTEGRATING ORAL EVERY 8 HOURS PRN
Qty: 10 TABLET | Refills: 0 | Status: SHIPPED | OUTPATIENT
Start: 2020-02-06 | End: 2020-02-09

## 2020-02-06 RX ORDER — ONDANSETRON 2 MG/ML
4 INJECTION INTRAMUSCULAR; INTRAVENOUS ONCE
Status: COMPLETED | OUTPATIENT
Start: 2020-02-06 | End: 2020-02-06

## 2020-02-06 RX ORDER — FENTANYL CITRATE 50 UG/ML
75 INJECTION, SOLUTION INTRAMUSCULAR; INTRAVENOUS ONCE
Status: COMPLETED | OUTPATIENT
Start: 2020-02-06 | End: 2020-02-06

## 2020-02-06 RX ORDER — OXYCODONE HYDROCHLORIDE 5 MG/1
10 TABLET ORAL ONCE
Status: DISCONTINUED | OUTPATIENT
Start: 2020-02-06 | End: 2020-02-06 | Stop reason: CLARIF

## 2020-02-06 RX ORDER — OXYCODONE HYDROCHLORIDE 5 MG/1
5-10 TABLET ORAL EVERY 6 HOURS PRN
Qty: 20 TABLET | Refills: 0 | Status: SHIPPED | OUTPATIENT
Start: 2020-02-06

## 2020-02-06 RX ORDER — HYDROMORPHONE HYDROCHLORIDE 1 MG/ML
0.5 INJECTION, SOLUTION INTRAMUSCULAR; INTRAVENOUS; SUBCUTANEOUS
Status: DISCONTINUED | OUTPATIENT
Start: 2020-02-06 | End: 2020-02-06 | Stop reason: HOSPADM

## 2020-02-06 RX ORDER — OXYCODONE HYDROCHLORIDE 5 MG/1
5 TABLET ORAL ONCE
Status: COMPLETED | OUTPATIENT
Start: 2020-02-06 | End: 2020-02-06

## 2020-02-06 RX ORDER — TAMSULOSIN HYDROCHLORIDE 0.4 MG/1
0.4 CAPSULE ORAL DAILY
Qty: 10 CAPSULE | Refills: 0 | Status: SHIPPED | OUTPATIENT
Start: 2020-02-06 | End: 2020-02-16

## 2020-02-06 RX ADMIN — SODIUM CHLORIDE 60 ML: 9 INJECTION, SOLUTION INTRAVENOUS at 06:08

## 2020-02-06 RX ADMIN — ONDANSETRON 4 MG: 2 INJECTION INTRAMUSCULAR; INTRAVENOUS at 05:33

## 2020-02-06 RX ADMIN — FENTANYL CITRATE 25 MCG: 50 INJECTION INTRAMUSCULAR; INTRAVENOUS at 05:34

## 2020-02-06 RX ADMIN — KETOROLAC TROMETHAMINE 15 MG: 15 INJECTION, SOLUTION INTRAMUSCULAR; INTRAVENOUS at 05:35

## 2020-02-06 RX ADMIN — HYDROMORPHONE HYDROCHLORIDE 0.5 MG: 1 INJECTION, SOLUTION INTRAMUSCULAR; INTRAVENOUS; SUBCUTANEOUS at 06:14

## 2020-02-06 RX ADMIN — SODIUM CHLORIDE 1000 ML: 9 INJECTION, SOLUTION INTRAVENOUS at 05:38

## 2020-02-06 RX ADMIN — FENTANYL CITRATE 75 MCG: 50 INJECTION INTRAMUSCULAR; INTRAVENOUS at 05:40

## 2020-02-06 RX ADMIN — OXYCODONE HYDROCHLORIDE 5 MG: 5 TABLET ORAL at 08:32

## 2020-02-06 RX ADMIN — IOPAMIDOL 60 ML: 755 INJECTION, SOLUTION INTRAVENOUS at 06:07

## 2020-02-06 ASSESSMENT — ENCOUNTER SYMPTOMS
FLANK PAIN: 1
FEVER: 0
ABDOMINAL PAIN: 1

## 2020-02-06 ASSESSMENT — MIFFLIN-ST. JEOR: SCORE: 1108.45

## 2020-02-06 NOTE — ED AVS SNAPSHOT
Emergency Department  [address] [place] [address]  Phone:  [phone]  Fax:  [phone]                                    Olga Vargas   MRN: [id number]    Department:   Emergency Department   Date of Visit:  2/6/2020           After Visit Summary Signature Page    I have received my discharge instructions, and my questions have been answered. I have discussed any challenges I see with this plan with the nurse or doctor.    ..........................................................................................................................................  Patient/Patient Representative Signature      ..........................................................................................................................................  Patient Representative Print Name and Relationship to Patient    ..................................................               ................................................  Date                                   Time    ..........................................................................................................................................  Reviewed by Signature/Title    ...................................................              ..............................................  Date                                               Time          22EPIC Rev 08/18

## 2020-02-06 NOTE — ED PROVIDER NOTES
"  History     Chief Complaint:  Flank Pain    HPI   Olga Vargas is a 54 year old female who presents with flank pain. This morning the patient was woken up this morning at 0445 with severe sharp right sided back pain. The pain is present in her right lower quadrant in her abdomen as well. She denies fever. The patient denies history of kidney stone or another episode of this nature.     Allergies:  Morphine     Medications:    atorvastatin   mometasone nasal spray    Past Medical History:    Dysmenorrhea   Endometrial cells on cervical Pap smear inconsistent w/LMP   Lung nodule   Hyperlipidemia   History of C diff    Past Surgical History:    Appendectomy  Coronary angiography  Endometrial biopsy  Breast biopsy  D&E for 20 week pregnancy loss    Family History:    Father - allergies  Mother - obesity, vascular calcification  Brother- allergies  Daughter - allergies    Social History:  The patient was accompanied to the ED by .  Tobacco use: negative   Alcohol use: positive, 3 to 4 times weekly  PCP: Anayeli Londono     Review of Systems   Constitutional: Negative for fever.   Gastrointestinal: Positive for abdominal pain.   Genitourinary: Positive for flank pain.   10 point review of systems performed and is negative except as above and in HPI.      Physical Exam     Patient Vitals for the past 24 hrs:   BP Temp Temp src Pulse Heart Rate Resp SpO2 Height Weight   02/06/20 0900 126/76 -- -- 60 -- -- 100 % -- --   02/06/20 0830 130/85 -- -- 61 -- -- 100 % -- --   02/06/20 0800 121/74 -- -- 66 -- -- 100 % -- --   02/06/20 0730 115/74 -- -- 65 -- -- 100 % -- --   02/06/20 0615 (!) 143/105 -- -- 72 -- -- 100 % -- --   02/06/20 0600 -- -- -- -- -- -- 100 % -- --   02/06/20 0540 -- -- -- -- -- -- 99 % -- --   02/06/20 0514 (!) 150/101 97.4  F (36.3  C) Oral -- 77 20 100 % 1.6 m (5' 3\") 54.4 kg (120 lb)        Physical Exam    General: Resting on the gurney, appears very uncomfortable  Head:  The scalp, face, " and head appear normal  Mouth/Throat: Mucus membranes are moist  CV:  Regular rate    Normal S1 and S2  No pathological murmur   Resp:  Breath sounds clear and equal bilaterally    Non-labored, no retractions or accessory muscle use    No coarseness    No wheezing   GI:  Abdomen is soft, no rigidity    No tenderness to palpation. No guarding, no rebound.   MS:  Normal motor assessment of all extremities.    Good capillary refill noted.  Skin:   No rash or lesions noted.  Neuro:  Speech is normal and fluent. No apparent deficit.  Psych: Awake. Alert.  Normal affect.      Appropriate interactions.      Emergency Department Course     Imaging:  Radiology findings were communicated with the patient who voiced understanding of the findings.    CT Abdomen Pelvis w Contrast  1. Moderate right hydronephrosis due to a 2 mm UVJ stone.   2. 2 small liver liver lesions most likely benign hemangiomas. This could be confirmed with outpatient MRI  Reading per radiology    Laboratory:  Laboratory findings were communicated with the patient who voiced understanding of the findings.    UA with microscopic: mucous present o/w WNL  Urine culture: pending    CBC: WBC 4.9, HGB 13.7,   BMP: potassium 3.1 (L), glucose 147 (H) o/w WNL (Creatinine 0.73)    Interventions:  0533 Zofran 4 mg oral  0534 Fentanyl 25 mcg IV  0535 Toradol 15 mg IV  0538 NS bolus 1,000 ml IV  0540 Fentanyl 75 mcg IV  0614 Dilaudid 0.5 mg IV  0832 Oxycodone 5 mg oral    Emergency Department Course:  Nursing notes and vitals reviewed.    0629 I performed an exam of the patient as documented above.     The patient was sent for a CT abdomen pelvis while in the emergency department, results above.     IV was inserted and blood was drawn for laboratory testing, results above.    The patient provided a urine sample here in the emergency department. This was sent for laboratory testing, findings above.    0932 I returned to update the patient about their plan for  discharge.     Findings and plan explained to the Patient. Patient discharged home with instructions regarding supportive care, medications, and reasons to return. The importance of close follow-up was reviewed. The patient was prescribed Flomax, ibuprofen, zofran, and oxycodone.     Impression & Plan      Medical Decision Making:  Olga Vargas is a 54 year old female who presents complaining of right sided flank pain.  Evaluation today was consistent with nephrolithiasis and renal colic.  This explains the patient's pain.  Based on history and exam and the above studies, I do not feel that there is evidence of other acute intraabdominal process at this time.  CT scan showed moderate right hydronephrosis due to a 2 mm UVJ stone.   UA was obtained and no overt signs of infection are present.  Given the presence of the kidney stone, urine culture was sent.  The patient received pain control, antiemetic and IV fluids as documented above.  Pain and nausea was well controlled with these measures.  The patient was able to tolerate PO.  Based on the patient's good response to symptomatic control, I feel that this patient can be managed expectantly with close outpatient follow up within the next several days.   The patient was instructed to return to the emergency department for uncontrolled pain, fever or inability to tolerate oral liquids.  Prescriptions for Flomax, ibuprofen, oxycodone, and zofran were given.      Diagnosis:    ICD-10-CM    1. Kidney stone N20.0        Disposition:   The patient is discharged to home.    Discharge Medications:  New Prescriptions    IBUPROFEN (ADVIL/MOTRIN) 600 MG TABLET    Take 1 tablet (600 mg) by mouth every 6 hours as needed for moderate pain    ONDANSETRON (ZOFRAN ODT) 4 MG ODT TAB    Take 1 tablet (4 mg) by mouth every 8 hours as needed    OXYCODONE (ROXICODONE) 5 MG TABLET    Take 1-2 tablets (5-10 mg) by mouth every 6 hours as needed for severe pain    TAMSULOSIN (FLOMAX) 0.4 MG  CAPSULE    Take 1 capsule (0.4 mg) by mouth daily for 10 doses       Scribe Disclosure:  I, Mariela Schultz, am serving as a scribe at 5:53 AM on 2/6/2020 to document services personally performed by [place] MD based on my observations and the provider's statements to me.        Sabrina Austin MD  02/07/20 0619

## 2020-02-07 LAB
BACTERIA SPEC CULT: NO GROWTH
Lab: NORMAL
SPECIMEN SOURCE: NORMAL

## 2020-02-10 ENCOUNTER — OFFICE VISIT (OUTPATIENT)
Dept: UROLOGY | Facility: CLINIC | Age: 55
End: 2020-02-10
Payer: COMMERCIAL

## 2020-02-10 VITALS
OXYGEN SATURATION: 98 % | HEIGHT: 63 IN | HEART RATE: 70 BPM | SYSTOLIC BLOOD PRESSURE: 126 MMHG | WEIGHT: 120 LBS | DIASTOLIC BLOOD PRESSURE: 80 MMHG | BODY MASS INDEX: 21.26 KG/M2

## 2020-02-10 DIAGNOSIS — N20.1 URETERAL STONE: Primary | ICD-10-CM

## 2020-02-10 PROCEDURE — 99203 OFFICE O/P NEW LOW 30 MIN: CPT | Performed by: PHYSICIAN ASSISTANT

## 2020-02-10 PROCEDURE — 82365 CALCULUS SPECTROSCOPY: CPT | Mod: 90 | Performed by: PHYSICIAN ASSISTANT

## 2020-02-10 PROCEDURE — 99000 SPECIMEN HANDLING OFFICE-LAB: CPT | Performed by: PHYSICIAN ASSISTANT

## 2020-02-10 ASSESSMENT — PAIN SCALES - GENERAL: PAINLEVEL: NO PAIN (0)

## 2020-02-10 ASSESSMENT — MIFFLIN-ST. JEOR: SCORE: 1108.45

## 2020-02-10 NOTE — PROGRESS NOTES
CC: kidney stone.    HPI: It is a pleasure to see Ms. Olga Vargas, a 54 year old female seen today in the urology clinic in ER follow up for evaluation of the above. This was first detected on CT in the ED on 2/6/2020 after the patient presented complaining of acute renal colic symptoms. The stone measured 2mm and is located in the right UVJ with associated hydronephrosis. UA in the ED was negative for infection. BMP revealed Cr and Ca to be normal. With pain under good control, the patient was discharged with a prescription for tamsulosin and instructions to follow up with urology.    Currently, the patient reports passage of stone and brings this with her today. Denies gross hematuria, dysuria, fevers, chills, N/V. No prior history of kidney stones.    RECENT IMAGING:  EXAM: CT ABDOMEN PELVIS W CONTRAST  LOCATION: [facility]  DATE/TIME: 2/6/2020 6:09 AM     INDICATION: Right flank pain.     COMPARISON: None.     TECHNIQUE: CT scan of the abdomen and pelvis was performed following injection of IV contrast. Multiplanar reformats were obtained. Dose reduction techniques were used.     CONTRAST: 60 mL Isovue-370.     FINDINGS:   LOWER CHEST: Normal.     HEPATOBILIARY: 1.8 cm enhancing lesion at the dome the liver and a subcentimeter enhancing lesion in the inferior right hepatic lobe are probably benign hemangioma though incompletely characterized.     PANCREAS: Normal.     SPLEEN: Normal.     ADRENAL GLANDS: Normal.     KIDNEYS/BLADDER: Moderate right hydronephrosis due to a 2 mm stone in the distal ureter at the UVJ. Delayed right nephrogram. Low-attenuation subcentimeter renal lesion(s). These are compatible with small benign cysts and no specific imaging evaluation   or follow-up is recommended.     BOWEL: Within normal limits. No bowel obstruction.     LYMPH NODES: Normal.     VASCULATURE: Unremarkable.     PELVIC ORGANS: Normal.     OTHER FINDINGS: No significant ascites.     MUSCULOSKELETAL:  Normal.                                                                      IMPRESSION:  1. Moderate right hydronephrosis due to a 2 mm UVJ stone.   2. 2 small liver liver lesions most likely benign hemangiomas. This could be confirmed with outpatient MRI       Past Medical History:   Diagnosis Date     Dysmenorrhea     controlled with OCP     Endometrial cells on cervical Pap smear inconsistent w/LMP 3/2016    EMB with Dr. Almaguer 3/17/16     Lung nodule     calcified right lung nodule     Mixed hyperlipidemia      Palpitations     negative Holter and echocardiogram      Perennial allergic rhinitis     worse spring, late summer     Supervision of other normal pregnancy      - vaginal/ D&E 20 week pregnancy loss - Down's       Past Surgical History:   Procedure Laterality Date     C APPENDECTOMY       C CT ANGIOGRAPHY CORONARY  2006    no coronary artery calcifications, calcified hilar lymph node     ENDOMETRIAL SAMPLING (BIOPSY)  3/17/16    endo cells found on pap-LMP was 2016     HC US ABDOMEN COMPLETE  2004    normal     HC US ABDOMEN LIMITED  10/2008    normal RUQ ultrasound     HC US PELVIC NON-OB, COMPLETE  10/2008    0.8 cm intramural fibroid within the posterior left uterine body     SURGICAL HISTORY OF -       D&E for 20 week pregnancy loss - Down's syndrome       Current Outpatient Medications   Medication Sig Dispense Refill     atorvastatin (LIPITOR) 40 MG tablet Take 1 tablet (40 mg) by mouth At Bedtime . Due for annual physical exam 90 tablet 0     ibuprofen (ADVIL/MOTRIN) 600 MG tablet Take 1 tablet (600 mg) by mouth every 6 hours as needed for moderate pain (Patient not taking: Reported on 2/10/2020) 30 tablet 0     mometasone (NASONEX) 50 MCG/ACT nasal spray USE 2 SPRAYS INTO EACH NOSTRIL ONCE DAILY (Patient not taking: Reported on 2/10/2020) 51 g 3     oxyCODONE (ROXICODONE) 5 MG tablet Take 1-2 tablets (5-10 mg) by mouth every 6 hours as needed for  severe pain (Patient not taking: Reported on 2/10/2020) 20 tablet 0     tamsulosin (FLOMAX) 0.4 MG capsule Take 1 capsule (0.4 mg) by mouth daily for 10 doses (Patient not taking: Reported on 2/10/2020) 10 capsule 0       Allergies   Allergen Reactions     Morphine Other (See Comments)     Fainted       FAMILY HISTORY: There is no family h/o  malignancy.  There is no family h/o urolithiasis.     Social History     Socioeconomic History     Marital status:      Spouse name: Julio     Number of children: 2     Years of education: 16     Highest education level: Not on file   Occupational History     Occupation: homemaker     Employer: HOME     Occupation:      Employer: SELF     Occupation: volunteering in school/swim team   Social Needs     Financial resource strain: Not on file     Food insecurity:     Worry: Not on file     Inability: Not on file     Transportation needs:     Medical: Not on file     Non-medical: Not on file   Tobacco Use     Smoking status: Never Smoker     Smokeless tobacco: Never Used   Substance and Sexual Activity     Alcohol use: Yes     Alcohol/week: 0.0 standard drinks     Comment: 3-4 per week     Drug use: No     Sexual activity: Yes     Partners: Male     Birth control/protection: Male Surgical     Comment: same partner since 1992,  3 partners lifelong   Lifestyle     Physical activity:     Days per week: Not on file     Minutes per session: Not on file     Stress: Not on file   Relationships     Social connections:     Talks on phone: Not on file     Gets together: Not on file     Attends Gnosticism service: Not on file     Active member of club or organization: Not on file     Attends meetings of clubs or organizations: Not on file     Relationship status: Not on file     Intimate partner violence:     Fear of current or ex partner: Not on file     Emotionally abused: Not on file     Physically abused: Not on file     Forced sexual activity: Not on file  "  Other Topics Concern      Service No     Blood Transfusions No     Caffeine Concern No     Comment: none     Occupational Exposure No     Hobby Hazards No     Sleep Concern No     Comment: 7 hrs     Stress Concern No     Comment: low     Weight Concern No     Special Diet No     Comment: milk, cheese, 2-3 servings dairy per day     Back Care Yes     Comment: neck sx improved     Exercise Yes     Comment: bar class/weights 60 min 2-3 days weekly     Bike Helmet Yes     Seat Belt Yes     Self-Exams Yes     Parent/sibling w/ CABG, MI or angioplasty before 65F 55M? Not Asked   Social History Narrative    Lives with .  Children ages 15 and 10.  Has a dog. Volunteers at school.       ROS: A comprehensive 14 point ROS was obtained and otherwise negative except for that outlined above in the HPI.    GENERAL PHYSICAL EXAM:   /80 (BP Location: Left arm, Patient Position: Sitting, Cuff Size: Adult Regular)   Pulse 70   Ht 1.6 m (5' 3\")   Wt 54.4 kg (120 lb)   SpO2 98%   BMI 21.26 kg/m    PSYCH: NAD  EYES: EOMI  MOUTH: MMM  NECK: Supple, no notable adenopathy  RESP: Unlabored breathing  CARDIAC: No LE edema  SKIN: Warm, no rashes  ABD: soft, Nontender  NEURO: AAO x3          ASSESSMENT AND PLAN: 54 year old female with a passed stone.   Standard recommendations on kidney stone prevention were provided.  Stone analysis.   RTO PEG Baca PA-C  [facility]    20 minutes were spent with the patient today, > 50% in counseling and coordination of care of kidney stone.    "

## 2020-02-10 NOTE — NURSING NOTE
Chief Complaint   Patient presents with     Follow Up     patient is here for follow up after passing kidney stone.      Jessica Martinez, CMA

## 2020-02-13 LAB
APPEARANCE STONE: NORMAL
COMPN STONE: NORMAL
NUMBER STONE: 2
SIZE STONE: NORMAL MM
WT STONE: 4 MG

## 2020-03-15 ENCOUNTER — HEALTH MAINTENANCE LETTER (OUTPATIENT)
Age: 55
End: 2020-03-15

## 2021-01-15 ENCOUNTER — HEALTH MAINTENANCE LETTER (OUTPATIENT)
Age: 56
End: 2021-01-15

## 2021-08-29 ENCOUNTER — HEALTH MAINTENANCE LETTER (OUTPATIENT)
Age: 56
End: 2021-08-29

## 2021-10-24 ENCOUNTER — HEALTH MAINTENANCE LETTER (OUTPATIENT)
Age: 56
End: 2021-10-24

## 2022-02-13 ENCOUNTER — HEALTH MAINTENANCE LETTER (OUTPATIENT)
Age: 57
End: 2022-02-13

## 2022-10-15 ENCOUNTER — HEALTH MAINTENANCE LETTER (OUTPATIENT)
Age: 57
End: 2022-10-15

## 2023-03-26 ENCOUNTER — HEALTH MAINTENANCE LETTER (OUTPATIENT)
Age: 58
End: 2023-03-26

## 2023-10-29 ENCOUNTER — HEALTH MAINTENANCE LETTER (OUTPATIENT)
Age: 58
End: 2023-10-29